# Patient Record
Sex: MALE | Race: WHITE | URBAN - METROPOLITAN AREA
[De-identification: names, ages, dates, MRNs, and addresses within clinical notes are randomized per-mention and may not be internally consistent; named-entity substitution may affect disease eponyms.]

---

## 2018-06-28 ENCOUNTER — APPOINTMENT (RX ONLY)
Dept: URBAN - METROPOLITAN AREA CLINIC 22 | Facility: CLINIC | Age: 68
Setting detail: DERMATOLOGY
End: 2018-06-28

## 2018-06-28 DIAGNOSIS — Z41.1 ENCOUNTER FOR COSMETIC SURGERY: ICD-10-CM

## 2018-06-28 PROBLEM — E78.5 HYPERLIPIDEMIA, UNSPECIFIED: Status: ACTIVE | Noted: 2018-06-28

## 2018-06-28 PROCEDURE — ? CONSULTATION - AESTHETIC FACIAL DEFORMITY

## 2018-06-28 PROCEDURE — 99003: CPT

## 2018-06-28 PROCEDURE — ? CONSULTATION - LIPOSUCTION

## 2018-06-28 ASSESSMENT — LOCATION SIMPLE DESCRIPTION DERM
LOCATION SIMPLE: RIGHT LOWER BACK
LOCATION SIMPLE: LEFT LOWER BACK
LOCATION SIMPLE: ABDOMEN
LOCATION SIMPLE: LEFT CHEEK
LOCATION SIMPLE: RIGHT CHEEK

## 2018-06-28 ASSESSMENT — LOCATION DETAILED DESCRIPTION DERM
LOCATION DETAILED: RIGHT LATERAL MALAR CHEEK
LOCATION DETAILED: RIGHT INFERIOR LATERAL LOWER BACK
LOCATION DETAILED: LEFT LATERAL MALAR CHEEK
LOCATION DETAILED: LEFT INFERIOR LATERAL LOWER BACK
LOCATION DETAILED: PERIUMBILICAL SKIN

## 2018-06-28 ASSESSMENT — LOCATION ZONE DERM
LOCATION ZONE: TRUNK
LOCATION ZONE: FACE

## 2019-10-07 ENCOUNTER — APPOINTMENT (RX ONLY)
Dept: URBAN - METROPOLITAN AREA CLINIC 11 | Facility: CLINIC | Age: 69
Setting detail: DERMATOLOGY
End: 2019-10-07

## 2019-10-07 DIAGNOSIS — Z41.9 ENCOUNTER FOR PROCEDURE FOR PURPOSES OTHER THAN REMEDYING HEALTH STATE, UNSPECIFIED: ICD-10-CM

## 2019-10-07 PROCEDURE — 99004: CPT

## 2019-10-07 PROCEDURE — ? PRE-OP WORKLIST

## 2019-10-07 PROCEDURE — ? PRESCRIPTION

## 2019-10-07 RX ORDER — GABAPENTIN 100 MG/1
CAPSULE ORAL
Qty: 15 | Refills: 0 | Status: ERX | COMMUNITY
Start: 2019-10-07

## 2019-10-07 RX ORDER — CEPHALEXIN 500 MG/1
CAPSULE ORAL
Qty: 28 | Refills: 0 | Status: ERX | COMMUNITY
Start: 2019-10-07

## 2019-10-07 RX ORDER — CELECOXIB 200 MG/1
CAPSULE ORAL
Qty: 15 | Refills: 0 | Status: ERX | COMMUNITY
Start: 2019-10-07

## 2019-10-07 RX ORDER — ONDANSETRON HYDROCHLORIDE 4 MG/1
TABLET, FILM COATED ORAL
Qty: 15 | Refills: 0 | Status: ERX | COMMUNITY
Start: 2019-10-07

## 2019-10-07 RX ORDER — OXYCODONE HYDROCHLORIDE AND ACETAMINOPHEN 5; 325 MG/1; MG/1
TABLET ORAL
Qty: 20 | Refills: 0 | COMMUNITY
Start: 2019-10-07

## 2019-10-07 RX ADMIN — CELECOXIB: 200 CAPSULE ORAL at 14:10

## 2019-10-07 RX ADMIN — GABAPENTIN: 100 CAPSULE ORAL at 14:10

## 2019-10-07 RX ADMIN — CEPHALEXIN: 500 CAPSULE ORAL at 14:09

## 2019-10-07 RX ADMIN — ONDANSETRON HYDROCHLORIDE: 4 TABLET, FILM COATED ORAL at 14:09

## 2019-10-07 RX ADMIN — OXYCODONE HYDROCHLORIDE AND ACETAMINOPHEN: 5; 325 TABLET ORAL at 14:09

## 2019-10-07 ASSESSMENT — LOCATION SIMPLE DESCRIPTION DERM
LOCATION SIMPLE: RIGHT CHEEK
LOCATION SIMPLE: LEFT CHEEK

## 2019-10-07 ASSESSMENT — LOCATION DETAILED DESCRIPTION DERM
LOCATION DETAILED: LEFT INFERIOR PREAURICULAR CHEEK
LOCATION DETAILED: RIGHT INFERIOR CENTRAL MALAR CHEEK

## 2019-10-07 ASSESSMENT — LOCATION ZONE DERM: LOCATION ZONE: FACE

## 2019-10-07 NOTE — HPI: PREOPERATIVE APPOINTMENT (BRIEF)
Has The Patient Completed Informed Consent?: is scheduled to complete informed consent documentation during this visit
Has The Patient Fulfilled Financial Responsibilities For Surgical Scheduling?: is scheduled to complete payment to fulfill financial responsibilities during this visit
Have Arrangements And Instructions Been Made For Patient Transportation?: Transportation arrangements have been made
Date Of Procedure?: 10/16/19
Facility: [facility]
Surgeon: Dr. Manrique

## 2019-10-07 NOTE — PROCEDURE: PRE-OP WORKLIST
Date Of Surgery: 10/16/2019
Surgery Scheduled: Facelift
Admission Status: outpatient
Surgeon: Dr. Manrique
Detail Level: Zone
Photos Taken?: yes

## 2019-10-16 ENCOUNTER — APPOINTMENT (RX ONLY)
Dept: URBAN - METROPOLITAN AREA SURGERY CENTER 4 | Facility: SURGERY CENTER | Age: 69
Setting detail: DERMATOLOGY
End: 2019-10-16

## 2019-10-16 DIAGNOSIS — Z41.1 ENCOUNTER FOR COSMETIC SURGERY: ICD-10-CM

## 2019-10-16 PROCEDURE — Z1040: HCPCS

## 2019-10-16 PROCEDURE — ? SET GLOBAL PERIOD

## 2019-10-16 PROCEDURE — 15829 RHYTIDECTOMY SMAS FLAP: CPT

## 2019-10-16 NOTE — PROCEDURE: SET GLOBAL PERIOD
Date Of Surgery (Mm/Dd/Yyyy): 10/16/2019
Detail Level: Detailed
Surgery Global Period: 0
Other Surgery Performed: facelift and lower lid chemical peel

## 2019-10-17 ENCOUNTER — APPOINTMENT (RX ONLY)
Dept: URBAN - METROPOLITAN AREA CLINIC 11 | Facility: CLINIC | Age: 69
Setting detail: DERMATOLOGY
End: 2019-10-17

## 2019-10-17 DIAGNOSIS — Z48.89 ENCOUNTER FOR OTHER SPECIFIED SURGICAL AFTERCARE: ICD-10-CM

## 2019-10-17 DIAGNOSIS — Z42.8 ENCOUNTER FOR OTHER PLASTIC AND RECONSTRUCTIVE SURGERY FOLLOWING MEDICAL PROCEDURE OR HEALED INJURY: ICD-10-CM

## 2019-10-17 PROCEDURE — 99024 POSTOP FOLLOW-UP VISIT: CPT

## 2019-10-17 PROCEDURE — ? PATIENT SPECIFIC COUNSELING

## 2019-10-17 PROCEDURE — ? COUNSELING - POST-OP CHECK, FACELIFT

## 2019-10-17 PROCEDURE — ? DRAIN REMOVAL

## 2019-10-17 PROCEDURE — ? NEEDLE ASPIRATION

## 2019-10-17 ASSESSMENT — LOCATION DETAILED DESCRIPTION DERM
LOCATION DETAILED: RIGHT LATERAL INFERIOR EYELID
LOCATION DETAILED: RIGHT SUPERIOR PREAURICULAR CHEEK
LOCATION DETAILED: RIGHT CENTRAL POSTAURICULAR SKIN
LOCATION DETAILED: LEFT SUPERIOR POSTAURICULAR SKIN
LOCATION DETAILED: LEFT LATERAL ZYGOMA
LOCATION DETAILED: SUBMENTAL CHIN
LOCATION DETAILED: LEFT LATERAL INFERIOR EYELID
LOCATION DETAILED: RIGHT SUPERIOR ANTERIOR NECK

## 2019-10-17 ASSESSMENT — LOCATION ZONE DERM
LOCATION ZONE: EYELID
LOCATION ZONE: SCALP
LOCATION ZONE: NECK
LOCATION ZONE: FACE

## 2019-10-17 ASSESSMENT — LOCATION SIMPLE DESCRIPTION DERM
LOCATION SIMPLE: RIGHT ANTERIOR NECK
LOCATION SIMPLE: LEFT INFERIOR EYELID
LOCATION SIMPLE: RIGHT CHEEK
LOCATION SIMPLE: LEFT ZYGOMA
LOCATION SIMPLE: SUBMENTAL CHIN
LOCATION SIMPLE: SCALP
LOCATION SIMPLE: RIGHT INFERIOR EYELID

## 2019-10-17 NOTE — HPI: POST-OP CHECK, COSMETIC (EXTENDED)
What Best Describes The Level Of Symmetry? (Check All That Apply): good
How Severe Is Your Pain?: mild
How Is Your Wound Healing?: fresh
Compared To The Last Evaluation, How Have The Findings Changed?: stable
Date Of Procedure: 10/16/2019

## 2019-10-17 NOTE — PROCEDURE: NEEDLE ASPIRATION
Method: 27 gauge needle
Consent: Informed consent was obtained and the patient verbalized full understanding. The risks, benefits, expectations and alternatives were reviewed in detail, including, but not limited to, the risks of delayed wound healing, infection, need for multiple incisions and drainages, recurrence, bleeding, injury to underlying structures (including nerves, devices, or organs), need for additional procedures, and pain.
Volume Aspirated In Cc (Optional): 3
Lesion Type: Seroma
Anesthesia Volume In Cc: 0.3
Detail Level: Simple
Anesthesia Type: 1% lidocaine with epinephrine

## 2019-10-18 ENCOUNTER — APPOINTMENT (RX ONLY)
Dept: URBAN - METROPOLITAN AREA CLINIC 11 | Facility: CLINIC | Age: 69
Setting detail: DERMATOLOGY
End: 2019-10-18

## 2019-10-18 DIAGNOSIS — Z48.89 ENCOUNTER FOR OTHER SPECIFIED SURGICAL AFTERCARE: ICD-10-CM

## 2019-10-18 PROCEDURE — 99024 POSTOP FOLLOW-UP VISIT: CPT

## 2019-10-18 PROCEDURE — ? COUNSELING - POST-OP CHECK, FACELIFT

## 2019-10-18 PROCEDURE — ? NEEDLE ASPIRATION

## 2019-10-18 ASSESSMENT — LOCATION SIMPLE DESCRIPTION DERM
LOCATION SIMPLE: RIGHT CHEEK
LOCATION SIMPLE: SCALP
LOCATION SIMPLE: LEFT ZYGOMA
LOCATION SIMPLE: RIGHT ANTERIOR NECK
LOCATION SIMPLE: SUBMENTAL CHIN

## 2019-10-18 ASSESSMENT — LOCATION ZONE DERM
LOCATION ZONE: SCALP
LOCATION ZONE: NECK
LOCATION ZONE: FACE

## 2019-10-18 ASSESSMENT — LOCATION DETAILED DESCRIPTION DERM
LOCATION DETAILED: LEFT LATERAL ZYGOMA
LOCATION DETAILED: RIGHT SUPERIOR ANTERIOR NECK
LOCATION DETAILED: SUBMENTAL CHIN
LOCATION DETAILED: RIGHT SUPERIOR POSTAURICULAR SKIN
LOCATION DETAILED: RIGHT SUPERIOR PREAURICULAR CHEEK
LOCATION DETAILED: LEFT CENTRAL POSTAURICULAR SKIN

## 2019-10-18 NOTE — PROCEDURE: NEEDLE ASPIRATION
Lesion Type: Seroma
Detail Level: Simple
Anesthesia Type: 1% lidocaine with epinephrine and a 1:10 solution of 8.4% sodium bicarbonate
Consent: Informed consent was obtained and the patient verbalized full understanding. The risks, benefits, expectations and alternatives were reviewed in detail, including, but not limited to, the risks of delayed wound healing, infection, need for multiple incisions and drainages, recurrence, bleeding, injury to underlying structures (including nerves, devices, or organs), need for additional procedures, and pain.
Method: 21 gauge needle
Volume Aspirated In Cc (Optional): 5
Anesthesia Volume In Cc: 0.3

## 2019-10-21 ENCOUNTER — APPOINTMENT (RX ONLY)
Dept: URBAN - METROPOLITAN AREA CLINIC 11 | Facility: CLINIC | Age: 69
Setting detail: DERMATOLOGY
End: 2019-10-21

## 2019-10-21 DIAGNOSIS — Z48.89 ENCOUNTER FOR OTHER SPECIFIED SURGICAL AFTERCARE: ICD-10-CM

## 2019-10-21 PROCEDURE — 99024 POSTOP FOLLOW-UP VISIT: CPT

## 2019-10-21 PROCEDURE — ? COUNSELING - POST-OP CHECK, FACELIFT

## 2019-10-23 ENCOUNTER — APPOINTMENT (RX ONLY)
Dept: URBAN - METROPOLITAN AREA CLINIC 11 | Facility: CLINIC | Age: 69
Setting detail: DERMATOLOGY
End: 2019-10-23

## 2019-10-23 DIAGNOSIS — Z48.89 ENCOUNTER FOR OTHER SPECIFIED SURGICAL AFTERCARE: ICD-10-CM

## 2019-10-23 PROCEDURE — 99024 POSTOP FOLLOW-UP VISIT: CPT

## 2019-10-23 PROCEDURE — ? COUNSELING - POST-OP CHECK, FACELIFT

## 2019-10-23 PROCEDURE — ? SUTURE REMOVAL

## 2019-10-23 ASSESSMENT — LOCATION SIMPLE DESCRIPTION DERM
LOCATION SIMPLE: RIGHT CHEEK
LOCATION SIMPLE: SUBMENTAL CHIN
LOCATION SIMPLE: LEFT CHEEK
LOCATION SIMPLE: SCALP

## 2019-10-23 ASSESSMENT — LOCATION DETAILED DESCRIPTION DERM
LOCATION DETAILED: RIGHT SUPERIOR PREAURICULAR CHEEK
LOCATION DETAILED: LEFT SUPERIOR POSTAURICULAR SKIN
LOCATION DETAILED: SUBMENTAL CHIN
LOCATION DETAILED: LEFT SUPERIOR PREAURICULAR CHEEK
LOCATION DETAILED: RIGHT SUPERIOR POSTAURICULAR SKIN
LOCATION DETAILED: RIGHT MID PREAURICULAR CHEEK

## 2019-10-23 ASSESSMENT — LOCATION ZONE DERM
LOCATION ZONE: FACE
LOCATION ZONE: SCALP

## 2019-10-23 NOTE — HPI: POST-OP CHECK, COSMETIC (EXTENDED)
What Best Describes The Level Of Symmetry? (Check All That Apply): good
How Severe Is Your Pain?: mild
How Is Your Wound Healing?: healing well
Compared To The Last Evaluation, How Have The Findings Changed?: improved
Date Of Procedure: 10/16/2019